# Patient Record
Sex: FEMALE | URBAN - METROPOLITAN AREA
[De-identification: names, ages, dates, MRNs, and addresses within clinical notes are randomized per-mention and may not be internally consistent; named-entity substitution may affect disease eponyms.]

---

## 2021-11-16 ENCOUNTER — OFFICE VISIT (OUTPATIENT)
Dept: URBAN - METROPOLITAN AREA CLINIC 76 | Facility: CLINIC | Age: 41
End: 2021-11-16

## 2021-11-16 DIAGNOSIS — H52.223 REGULAR ASTIGMATISM, BILATERAL: Primary | ICD-10-CM

## 2021-11-16 PROCEDURE — 99203 OFFICE O/P NEW LOW 30 MIN: CPT | Performed by: OPTOMETRIST

## 2021-11-16 ASSESSMENT — KERATOMETRY
OS: 42.13
OD: 43.25

## 2021-11-16 ASSESSMENT — INTRAOCULAR PRESSURE
OD: 13
OS: 15

## 2021-11-16 ASSESSMENT — VISUAL ACUITY
OD: 20/30
OS: 20/25

## 2021-11-16 NOTE — IMPRESSION/PLAN
Impression: Regular astigmatism, bilateral: H52.223. Myopic shift OU. Pt denies Dx of diabetes. Pt stumbling, bumping into walls, constantly moving during testing and falling asleep during refraction. Pt excuse is tiredness from just getting off work, working overnight. Plan: A glasses prescription has been discussed and generated. Recommended bi-focal.  Patient to call with any concerns.

## 2022-01-25 ENCOUNTER — TESTING ONLY (OUTPATIENT)
Dept: URBAN - METROPOLITAN AREA CLINIC 76 | Facility: CLINIC | Age: 42
End: 2022-01-25

## 2022-01-25 PROCEDURE — V2799 MISC VISION ITEM OR SERVICE: HCPCS | Performed by: OPTOMETRIST

## 2022-01-25 PROCEDURE — 92015 DETERMINE REFRACTIVE STATE: CPT | Performed by: OPTOMETRIST

## 2022-01-25 ASSESSMENT — VISUAL ACUITY
OS: 20/20
OD: 20/20

## 2022-01-25 NOTE — IMPRESSION/PLAN
Impression: Regular astigmatism, bilateral: H52.223. pt only filled Rx for distance, c/o blurred NVA w/ new gls OU. Sees well enough with old DV Rx at near OU. Plan: Discussed and explained need for bifocals to see well up close with gl on.